# Patient Record
Sex: FEMALE | Race: WHITE | Employment: FULL TIME | URBAN - METROPOLITAN AREA
[De-identification: names, ages, dates, MRNs, and addresses within clinical notes are randomized per-mention and may not be internally consistent; named-entity substitution may affect disease eponyms.]

---

## 2018-01-18 ENCOUNTER — HOSPITAL ENCOUNTER (OUTPATIENT)
Age: 43
Discharge: HOME | End: 2018-01-18
Attending: FAMILY MEDICINE
Payer: COMMERCIAL

## 2018-01-18 VITALS
SYSTOLIC BLOOD PRESSURE: 133 MMHG | WEIGHT: 190 LBS | DIASTOLIC BLOOD PRESSURE: 79 MMHG | HEIGHT: 68 IN | HEART RATE: 92 BPM | OXYGEN SATURATION: 99 % | BODY MASS INDEX: 28.79 KG/M2 | TEMPERATURE: 101 F | RESPIRATION RATE: 20 BRPM

## 2018-01-18 DIAGNOSIS — J11.1 INFLUENZA: ICD-10-CM

## 2018-01-18 DIAGNOSIS — R50.9 FEBRILE ILLNESS, ACUTE: Primary | ICD-10-CM

## 2018-01-18 LAB
FLUAV + FLUBV RNA SPEC NAA+PROBE: NEGATIVE
FLUAV + FLUBV RNA SPEC NAA+PROBE: NEGATIVE
FLUAV + FLUBV RNA SPEC NAA+PROBE: POSITIVE

## 2018-01-18 PROCEDURE — 99204 OFFICE O/P NEW MOD 45 MIN: CPT

## 2018-01-18 PROCEDURE — 87631 RESP VIRUS 3-5 TARGETS: CPT | Performed by: FAMILY MEDICINE

## 2018-01-18 PROCEDURE — 99203 OFFICE O/P NEW LOW 30 MIN: CPT

## 2018-01-18 RX ORDER — OSELTAMIVIR PHOSPHATE 75 MG/1
75 CAPSULE ORAL 2 TIMES DAILY
Qty: 10 CAPSULE | Refills: 0 | Status: SHIPPED | OUTPATIENT
Start: 2018-01-18 | End: 2020-10-28

## 2018-01-18 NOTE — ED NOTES
Pt reports having a temp of 100.3 a hour ago and took tylenol. Also notes taking advil approx 4 hours ago.

## 2018-01-18 NOTE — ED PROVIDER NOTES
Patient Seen in: [facility]    History   CC:  Patient presents with:  Fever (infectious)    Stated Complaint: Flu Symptoms    ------------------------------  Per Rn:     Pt--reports having a temp of 100.3 a hour ago and canals, both ears   Nose:   Nares normal, septum midline, mucosa normal, min drainage, clear, no sinus tenderness   Throat:   mild hyperemia, no exudate;    Neck:   Supple, symmetrical, trachea midline, no adenopathy;     thyroid:  no enlargement/tendernes

## 2018-01-18 NOTE — ED INITIAL ASSESSMENT (HPI)
Pt presents to the IC with c/o aches, chills, and low grade fever. +nasal congestion with sinus pressure. +cough, non-productive. +ear pain to the right. No sore throat.

## 2018-06-09 ENCOUNTER — HOSPITAL ENCOUNTER (OUTPATIENT)
Age: 43
Discharge: HOME | End: 2018-06-09
Attending: EMERGENCY MEDICINE
Payer: COMMERCIAL

## 2018-06-09 VITALS
DIASTOLIC BLOOD PRESSURE: 88 MMHG | OXYGEN SATURATION: 99 % | WEIGHT: 180 LBS | HEIGHT: 69 IN | RESPIRATION RATE: 16 BRPM | HEART RATE: 89 BPM | SYSTOLIC BLOOD PRESSURE: 133 MMHG | BODY MASS INDEX: 26.66 KG/M2 | TEMPERATURE: 99 F

## 2018-06-09 DIAGNOSIS — L01.00 IMPETIGO: Primary | ICD-10-CM

## 2018-06-09 PROCEDURE — 99214 OFFICE O/P EST MOD 30 MIN: CPT

## 2018-06-09 PROCEDURE — 99213 OFFICE O/P EST LOW 20 MIN: CPT

## 2018-06-09 RX ORDER — DESVENLAFAXINE 100 MG/1
100 TABLET, EXTENDED RELEASE ORAL DAILY
COMMUNITY
End: 2020-10-28

## 2018-06-09 RX ORDER — AMOXICILLIN 875 MG/1
875 TABLET, COATED ORAL 2 TIMES DAILY
Qty: 20 TABLET | Refills: 0 | Status: SHIPPED | OUTPATIENT
Start: 2018-06-09 | End: 2018-06-19

## 2018-06-09 NOTE — ED PROVIDER NOTES
Patient Seen in: [address]    History   Patient presents with: Impetigo    Stated Complaint:     HPI    Patient is a 56-year-old female who in the last 24 hours developed a sore above her upper lip.   She also has one i Abdominal: Soft. Bowel sounds are normal. Exhibits no distension and no mass. There is no tenderness. There is no rebound and no guarding. Musculoskeletal: Normal range of motion. Exhibits no edema or tenderness.    Lymphadenopathy: No cervical adenopat

## 2018-06-09 NOTE — ED INITIAL ASSESSMENT (HPI)
Patietn complains of Impetigo on center of upper lip and right nostril. Patient has had impetigo in past. Patient states it is itchy. Patient states that the one on upper lip is painful.

## 2018-06-18 ENCOUNTER — HOSPITAL ENCOUNTER (OUTPATIENT)
Age: 43
Discharge: HOME | End: 2018-06-18
Attending: FAMILY MEDICINE
Payer: COMMERCIAL

## 2018-06-18 VITALS
HEART RATE: 85 BPM | SYSTOLIC BLOOD PRESSURE: 127 MMHG | DIASTOLIC BLOOD PRESSURE: 82 MMHG | TEMPERATURE: 99 F | RESPIRATION RATE: 20 BRPM | OXYGEN SATURATION: 100 %

## 2018-06-18 DIAGNOSIS — M75.82 TENDINITIS OF LEFT ROTATOR CUFF: Primary | ICD-10-CM

## 2018-06-18 PROCEDURE — 99214 OFFICE O/P EST MOD 30 MIN: CPT

## 2018-06-18 PROCEDURE — 99213 OFFICE O/P EST LOW 20 MIN: CPT

## 2018-06-18 RX ORDER — TRAMADOL HYDROCHLORIDE 50 MG/1
50 TABLET ORAL EVERY 6 HOURS PRN
Qty: 8 TABLET | Refills: 0 | Status: SHIPPED | OUTPATIENT
Start: 2018-06-18 | End: 2018-06-20

## 2018-06-18 RX ORDER — NAPROXEN 500 MG/1
500 TABLET ORAL 2 TIMES DAILY WITH MEALS
Qty: 14 TABLET | Refills: 0 | Status: SHIPPED | OUTPATIENT
Start: 2018-06-18 | End: 2018-06-25

## 2018-06-18 NOTE — ED PROVIDER NOTES
Patient Seen in: [address]    History   Patient presents with:  Upper Extremity Injury (musculoskeletal)    Stated Complaint: left shoulder pain    HPI    Patient is here with the left shoulder pain.   Patient sprained h deformity. Range of movement is limited partially to full abduction and external rotation. No difficulty with adduction and internal rotation. Left elbow, wrist range of movement is normal.  Normal distal motor function and sensory. Normal DTR.     L

## 2018-06-18 NOTE — ED INITIAL ASSESSMENT (HPI)
Patient is here with left shoulder pain which started while doing burpees  She states her left shoulder is painful to move.

## 2019-07-05 ENCOUNTER — HOSPITAL ENCOUNTER (OUTPATIENT)
Age: 44
Discharge: HOME | End: 2019-07-05
Attending: EMERGENCY MEDICINE
Payer: COMMERCIAL

## 2019-07-05 VITALS
HEIGHT: 68 IN | HEART RATE: 75 BPM | OXYGEN SATURATION: 100 % | RESPIRATION RATE: 16 BRPM | TEMPERATURE: 98 F | BODY MASS INDEX: 27.28 KG/M2 | WEIGHT: 180 LBS | SYSTOLIC BLOOD PRESSURE: 131 MMHG | DIASTOLIC BLOOD PRESSURE: 75 MMHG

## 2019-07-05 DIAGNOSIS — B36.0 PITYRIASIS VERSICOLOR: Primary | ICD-10-CM

## 2019-07-05 DIAGNOSIS — K64.9 HEMORRHOIDS, UNSPECIFIED HEMORRHOID TYPE: ICD-10-CM

## 2019-07-05 PROCEDURE — 99213 OFFICE O/P EST LOW 20 MIN: CPT

## 2019-07-05 RX ORDER — KETOCONAZOLE 20 MG/G
1 CREAM TOPICAL DAILY
Qty: 15 G | Refills: 1 | Status: SHIPPED | OUTPATIENT
Start: 2019-07-05 | End: 2020-10-28

## 2019-07-05 NOTE — ED INITIAL ASSESSMENT (HPI)
Patient states having rash to upper abdominal area x several days. 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 Patient denies fever, denies itchiness, denies blistering/drainage, denies pain to affected area.

## 2019-07-05 NOTE — ED PROVIDER NOTES
Patient Seen in: [address]    History   Patient presents with:  Rash Skin Problem (integumentary)    Stated Complaint: rash    HPI    The patient is a 40-year-old female with past history of recently diagnosed herpes si conjunctival injection  ENT: TMs are clear and flat bilaterally.   There is no posterior pharyngeal erythema  Chest: Clear to auscultation, no tenderness  Cardiovascular: Regular rate and rhythm without murmur  Abdomen: Soft, nontender and nondistended  Rec

## 2020-01-01 ENCOUNTER — HOSPITAL ENCOUNTER (OUTPATIENT)
Age: 45
Discharge: HOME | End: 2020-01-01
Attending: EMERGENCY MEDICINE
Payer: COMMERCIAL

## 2020-01-01 VITALS
HEART RATE: 77 BPM | SYSTOLIC BLOOD PRESSURE: 153 MMHG | OXYGEN SATURATION: 98 % | RESPIRATION RATE: 18 BRPM | DIASTOLIC BLOOD PRESSURE: 65 MMHG | TEMPERATURE: 98 F

## 2020-01-01 DIAGNOSIS — S29.012A STRAIN OF THORACIC SPINE: Primary | ICD-10-CM

## 2020-01-01 PROCEDURE — 99213 OFFICE O/P EST LOW 20 MIN: CPT

## 2020-01-01 PROCEDURE — 99214 OFFICE O/P EST MOD 30 MIN: CPT

## 2020-01-01 RX ORDER — IBUPROFEN 400 MG/1
400 TABLET ORAL EVERY 8 HOURS PRN
Qty: 30 TABLET | Refills: 0 | Status: SHIPPED | OUTPATIENT
Start: 2020-01-01 | End: 2020-01-08

## 2020-01-01 RX ORDER — CYCLOBENZAPRINE HCL 10 MG
5 TABLET ORAL 3 TIMES DAILY PRN
Qty: 15 TABLET | Refills: 0 | Status: SHIPPED | OUTPATIENT
Start: 2020-01-01 | End: 2020-01-06

## 2020-01-01 NOTE — ED INITIAL ASSESSMENT (HPI)
Patient is here after pulling on a thing at work hurting her back. She is having left mid back pain.

## 2020-01-01 NOTE — ED PROVIDER NOTES
Patient Seen in: [address]      History   Patient presents with:  Back Pain    Stated Complaint: back pain    HPI    The patient is a 57-year-old female with no significant past medical history presents now with left th no tenderness  Cardiovascular: Regular rate and rhythm without murmur  Abdomen: Soft, nontender and nondistended  Back: There is mild focal left thoracic muscular tenderness to palpation, just medial to the left scapula.   This tenderness worsens with palpa

## 2020-10-28 ENCOUNTER — HOSPITAL ENCOUNTER (OUTPATIENT)
Age: 45
Discharge: HOME | End: 2020-10-28
Payer: COMMERCIAL

## 2020-10-28 VITALS
RESPIRATION RATE: 17 BRPM | TEMPERATURE: 98 F | SYSTOLIC BLOOD PRESSURE: 137 MMHG | WEIGHT: 190 LBS | BODY MASS INDEX: 29 KG/M2 | OXYGEN SATURATION: 100 % | HEART RATE: 81 BPM | DIASTOLIC BLOOD PRESSURE: 90 MMHG

## 2020-10-28 DIAGNOSIS — B34.9 VIRAL ILLNESS: Primary | ICD-10-CM

## 2020-10-28 DIAGNOSIS — Z20.822 EXPOSURE TO COVID-19 VIRUS: ICD-10-CM

## 2020-10-28 DIAGNOSIS — R51.9 ACUTE NONINTRACTABLE HEADACHE, UNSPECIFIED HEADACHE TYPE: ICD-10-CM

## 2020-10-28 DIAGNOSIS — Z20.822 ENCOUNTER FOR SCREENING LABORATORY TESTING FOR COVID-19 VIRUS: ICD-10-CM

## 2020-10-28 PROCEDURE — 99202 OFFICE O/P NEW SF 15 MIN: CPT | Performed by: NURSE PRACTITIONER

## 2020-10-28 PROCEDURE — U0003 INFECTIOUS AGENT DETECTION BY NUCLEIC ACID (DNA OR RNA); SEVERE ACUTE RESPIRATORY SYNDROME CORONAVIRUS 2 (SARS-COV-2) (CORONAVIRUS DISEASE [COVID-19]), AMPLIFIED PROBE TECHNIQUE, MAKING USE OF HIGH THROUGHPUT TECHNOLOGIES AS DESCRIBED BY CMS-2020-01-R: HCPCS | Performed by: NURSE PRACTITIONER

## 2020-10-28 RX ORDER — DESVENLAFAXINE 100 MG/1
100 TABLET, EXTENDED RELEASE ORAL DAILY
COMMUNITY

## 2020-10-28 NOTE — ED PROVIDER NOTES
Patient Seen in: [facility]      History   Patient presents with:  Headache  Body ache and/or chills  Fatigue    Stated Complaint: testing    HPI    This is a 40-year-old female is presenting with headache body aches for 2 days.   Patient sta Cardiovascular:      Rate and Rhythm: Normal rate. Pulmonary:      Effort: Pulmonary effort is normal.      Breath sounds: Normal breath sounds.       Comments: Lung sounds clear to auscultation no wheezing no hypoxia no respiratory distress  Musculoske

## 2020-11-30 ENCOUNTER — HOSPITAL ENCOUNTER (OUTPATIENT)
Age: 45
Discharge: HOME | End: 2020-11-30
Payer: COMMERCIAL

## 2020-11-30 ENCOUNTER — APPOINTMENT (OUTPATIENT)
Dept: GENERAL RADIOLOGY | Age: 45
End: 2020-11-30
Attending: NURSE PRACTITIONER
Payer: COMMERCIAL

## 2020-11-30 VITALS
RESPIRATION RATE: 19 BRPM | OXYGEN SATURATION: 100 % | TEMPERATURE: 98 F | WEIGHT: 190 LBS | HEART RATE: 98 BPM | SYSTOLIC BLOOD PRESSURE: 122 MMHG | DIASTOLIC BLOOD PRESSURE: 90 MMHG | BODY MASS INDEX: 29 KG/M2

## 2020-11-30 DIAGNOSIS — J18.9 PNEUMONIA DUE TO INFECTIOUS ORGANISM, UNSPECIFIED LATERALITY, UNSPECIFIED PART OF LUNG: Primary | ICD-10-CM

## 2020-11-30 PROCEDURE — 87880 STREP A ASSAY W/OPTIC: CPT | Performed by: NURSE PRACTITIONER

## 2020-11-30 PROCEDURE — 99214 OFFICE O/P EST MOD 30 MIN: CPT | Performed by: NURSE PRACTITIONER

## 2020-11-30 PROCEDURE — 71046 X-RAY EXAM CHEST 2 VIEWS: CPT | Performed by: NURSE PRACTITIONER

## 2020-11-30 RX ORDER — ALBUTEROL SULFATE 90 UG/1
2 AEROSOL, METERED RESPIRATORY (INHALATION) EVERY 4 HOURS PRN
Qty: 1 INHALER | Refills: 0 | Status: SHIPPED | OUTPATIENT
Start: 2020-11-30 | End: 2020-12-30

## 2020-11-30 RX ORDER — DOXYCYCLINE HYCLATE 100 MG/1
100 CAPSULE ORAL 2 TIMES DAILY
Qty: 20 CAPSULE | Refills: 0 | Status: SHIPPED | OUTPATIENT
Start: 2020-11-30 | End: 2020-12-10

## 2020-11-30 NOTE — ED INITIAL ASSESSMENT (HPI)
Pt states she has had a cough, headache, chest pain when she takes a deep breath, runny nose and wheezing for 3 weeks. Pt was tested for x2 for Covid previously which were negative. Symptoms are worse at night.

## 2020-11-30 NOTE — ED PROVIDER NOTES
Patient presents with:  Cough/URI      HPI:     Camilla Ch is a 50year old female who presents for evaluation of a cough and congestion for the past 3 weeks.   She states she has had a productive cough with green and yellow sputum and thick pur file        Gets together: Not on file        Attends Muslim service: Not on file        Active member of club or organization: Not on file        Attends meetings of clubs or organizations: Not on file        Relationship status: Not on file      Intim Reason for Exam?          Answer: cold      POCT Rapid Strep Once      2019 Novel Coronavirus SARS-CoV-2 by PCR ([facility]) Once      POCT Rapid Strep      Doxycycline Hyclate 100 MG Oral Cap          Sig: Take 1 capsule (100 mg total) by mouth 2 (two) times d with:  [facility] [employer]  [address]    Schedule an appointment as soon as possible for a visit in 3 days

## 2022-01-13 ENCOUNTER — APPOINTMENT (OUTPATIENT)
Dept: GENERAL RADIOLOGY | Age: 47
End: 2022-01-13
Attending: NURSE PRACTITIONER
Payer: COMMERCIAL

## 2022-01-13 ENCOUNTER — HOSPITAL ENCOUNTER (OUTPATIENT)
Age: 47
Discharge: HOME | End: 2022-01-13
Payer: COMMERCIAL

## 2022-01-13 VITALS
RESPIRATION RATE: 18 BRPM | OXYGEN SATURATION: 100 % | HEIGHT: 69 IN | BODY MASS INDEX: 26.66 KG/M2 | WEIGHT: 180 LBS | DIASTOLIC BLOOD PRESSURE: 96 MMHG | SYSTOLIC BLOOD PRESSURE: 135 MMHG | HEART RATE: 80 BPM | TEMPERATURE: 99 F

## 2022-01-13 DIAGNOSIS — J06.9 UPPER RESPIRATORY VIRUS: Primary | ICD-10-CM

## 2022-01-13 LAB
S PYO AG THROAT QL: NEGATIVE
SARS-COV-2 RNA RESP QL NAA+PROBE: NOT DETECTED

## 2022-01-13 PROCEDURE — U0002 COVID-19 LAB TEST NON-CDC: HCPCS | Performed by: NURSE PRACTITIONER

## 2022-01-13 PROCEDURE — 71046 X-RAY EXAM CHEST 2 VIEWS: CPT | Performed by: NURSE PRACTITIONER

## 2022-01-13 PROCEDURE — 87880 STREP A ASSAY W/OPTIC: CPT | Performed by: NURSE PRACTITIONER

## 2022-01-13 PROCEDURE — 99213 OFFICE O/P EST LOW 20 MIN: CPT | Performed by: NURSE PRACTITIONER

## 2022-01-13 RX ORDER — BENZONATATE 100 MG/1
200 CAPSULE ORAL 3 TIMES DAILY PRN
Qty: 30 CAPSULE | Refills: 0 | Status: SHIPPED | OUTPATIENT
Start: 2022-01-13 | End: 2022-02-12

## 2022-01-13 RX ORDER — ALBUTEROL SULFATE 90 UG/1
2 AEROSOL, METERED RESPIRATORY (INHALATION) EVERY 4 HOURS PRN
Qty: 1 EACH | Refills: 0 | Status: SHIPPED | OUTPATIENT
Start: 2022-01-13 | End: 2022-02-12

## 2022-01-13 NOTE — ED INITIAL ASSESSMENT (HPI)
Pt presents with sore throat and chest congestion today in am. Pt has hx of pneumonia - Pt stated, \"worried about pneumonia, I'd like a chest Xray\".

## 2022-01-13 NOTE — ED PROVIDER NOTES
Patient presents with:  Sore Throat      HPI:     Tanisha Ramirez is a 48year old female who presents for productive cough with green sputum, sore throat, nasal congestion that started today. No chest pain or difficulty breathing. No fevers.   She Wt 81.6 kg   SpO2 100%   BMI 26.58 kg/m²   GENERAL: well developed, well nourished, well hydrated, no distress  SKIN: good skin turgor, no obvious rashes  NECK: supple, no adenopathy. No neck stiffness.   CARDIO: RRR without murmur  EXTREMITIES: no cyanosis 01/13/22 10:21 AM   Result Value Ref Range    POCT Rapid Strep Negative Negative       MDM:   XR CHEST PA + LAT CHEST (CPT=71046)    Result Date: 1/13/2022  CONCLUSION:  1. Normal heart and lungs. 2. Osteoarthritis. 3. Little change from November 30, 2020.

## 2022-05-26 ENCOUNTER — HOSPITAL ENCOUNTER (OUTPATIENT)
Age: 47
Discharge: HOME | End: 2022-05-26
Payer: COMMERCIAL

## 2022-05-26 VITALS
TEMPERATURE: 101 F | HEART RATE: 93 BPM | RESPIRATION RATE: 18 BRPM | DIASTOLIC BLOOD PRESSURE: 69 MMHG | WEIGHT: 190 LBS | BODY MASS INDEX: 28.79 KG/M2 | HEIGHT: 68 IN | OXYGEN SATURATION: 98 % | SYSTOLIC BLOOD PRESSURE: 121 MMHG

## 2022-05-26 DIAGNOSIS — R50.9 FEVER: ICD-10-CM

## 2022-05-26 DIAGNOSIS — Z20.822 ENCOUNTER FOR SCREENING LABORATORY TESTING FOR COVID-19 VIRUS: ICD-10-CM

## 2022-05-26 DIAGNOSIS — U07.1 COVID-19 VIRUS INFECTION: Primary | ICD-10-CM

## 2022-05-26 LAB
POCT INFLUENZA A: NEGATIVE
POCT INFLUENZA B: NEGATIVE
SARS-COV-2 RNA RESP QL NAA+PROBE: DETECTED

## 2022-05-26 RX ORDER — BEBTELOVIMAB 87.5 MG/ML
175 INJECTION, SOLUTION INTRAVENOUS ONCE
Status: COMPLETED | OUTPATIENT
Start: 2022-05-26 | End: 2022-05-26

## 2022-05-26 RX ORDER — ACETAMINOPHEN 500 MG
1000 TABLET ORAL ONCE
Status: COMPLETED | OUTPATIENT
Start: 2022-05-26 | End: 2022-05-26

## 2022-05-26 RX ORDER — ACETAMINOPHEN 325 MG/1
650 TABLET ORAL
Qty: 40 TABLET | Refills: 0 | Status: SHIPPED | OUTPATIENT
Start: 2022-05-26

## 2022-05-26 NOTE — ED QUICK NOTES
1130- pt medicated per order, aware of plan of care. 1150- pt stable , denies any new complaints . 1255-pt discharged stable , denies any new complaints, iv d/c'd , pt breathing easy , no distress noted.

## 2022-06-04 ENCOUNTER — HOSPITAL ENCOUNTER (OUTPATIENT)
Age: 47
Discharge: HOME | End: 2022-06-04
Payer: COMMERCIAL

## 2022-06-04 VITALS
TEMPERATURE: 98 F | HEART RATE: 88 BPM | DIASTOLIC BLOOD PRESSURE: 85 MMHG | RESPIRATION RATE: 18 BRPM | OXYGEN SATURATION: 100 % | SYSTOLIC BLOOD PRESSURE: 129 MMHG

## 2022-06-04 DIAGNOSIS — H66.91 RIGHT OTITIS MEDIA, UNSPECIFIED OTITIS MEDIA TYPE: Primary | ICD-10-CM

## 2022-06-04 LAB — S PYO AG THROAT QL: NEGATIVE

## 2022-06-04 RX ORDER — AMOXICILLIN 875 MG/1
875 TABLET, COATED ORAL 2 TIMES DAILY
Qty: 20 TABLET | Refills: 0 | Status: SHIPPED | OUTPATIENT
Start: 2022-06-04 | End: 2022-06-14

## 2022-10-08 NOTE — ED INITIAL ASSESSMENT (HPI)
Pt presents with headache, body aches and fatigue for 2 days. Had an exposure to a co-worker 4 days ago who tested positive to covid. 08-Oct-2022 01:28

## 2022-10-26 ENCOUNTER — HOSPITAL ENCOUNTER (OUTPATIENT)
Age: 47
Discharge: HOME | End: 2022-10-26
Payer: COMMERCIAL

## 2022-10-26 VITALS
SYSTOLIC BLOOD PRESSURE: 140 MMHG | OXYGEN SATURATION: 100 % | TEMPERATURE: 99 F | RESPIRATION RATE: 18 BRPM | HEART RATE: 89 BPM | DIASTOLIC BLOOD PRESSURE: 95 MMHG

## 2022-10-26 DIAGNOSIS — L01.00 IMPETIGO: Primary | ICD-10-CM

## 2022-10-26 PROCEDURE — 99213 OFFICE O/P EST LOW 20 MIN: CPT | Performed by: PHYSICIAN ASSISTANT

## 2022-10-26 RX ORDER — CEPHALEXIN 500 MG/1
500 CAPSULE ORAL 4 TIMES DAILY
Qty: 28 CAPSULE | Refills: 0 | Status: SHIPPED | OUTPATIENT
Start: 2022-10-26 | End: 2022-11-02

## 2022-10-26 NOTE — ED INITIAL ASSESSMENT (HPI)
Patient comes in with complaints of what she has had before and referred to it as impetigo, itchy, crusty, only on her face x 2days

## 2023-02-03 ENCOUNTER — APPOINTMENT (OUTPATIENT)
Dept: GENERAL RADIOLOGY | Age: 48
End: 2023-02-03
Attending: NURSE PRACTITIONER
Payer: COMMERCIAL

## 2023-02-03 ENCOUNTER — HOSPITAL ENCOUNTER (OUTPATIENT)
Age: 48
Discharge: HOME | End: 2023-02-03
Payer: COMMERCIAL

## 2023-02-03 VITALS
OXYGEN SATURATION: 100 % | RESPIRATION RATE: 20 BRPM | DIASTOLIC BLOOD PRESSURE: 71 MMHG | SYSTOLIC BLOOD PRESSURE: 120 MMHG | WEIGHT: 190 LBS | BODY MASS INDEX: 28.79 KG/M2 | TEMPERATURE: 99 F | HEART RATE: 96 BPM | HEIGHT: 68 IN

## 2023-02-03 DIAGNOSIS — Z77.098 CHEMICAL EXPOSURE: Primary | ICD-10-CM

## 2023-02-03 PROCEDURE — 99213 OFFICE O/P EST LOW 20 MIN: CPT | Performed by: NURSE PRACTITIONER

## 2023-02-03 PROCEDURE — 71046 X-RAY EXAM CHEST 2 VIEWS: CPT | Performed by: NURSE PRACTITIONER

## 2023-02-03 PROCEDURE — 94640 AIRWAY INHALATION TREATMENT: CPT | Performed by: NURSE PRACTITIONER

## 2023-02-03 RX ORDER — ONDANSETRON 4 MG/1
4 TABLET, ORALLY DISINTEGRATING ORAL ONCE
Status: COMPLETED | OUTPATIENT
Start: 2023-02-03 | End: 2023-02-03

## 2023-02-03 RX ORDER — ALBUTEROL SULFATE 2.5 MG/3ML
2.5 SOLUTION RESPIRATORY (INHALATION) ONCE
Status: COMPLETED | OUTPATIENT
Start: 2023-02-03 | End: 2023-02-03

## 2023-02-03 RX ORDER — ALBUTEROL SULFATE 90 UG/1
2 AEROSOL, METERED RESPIRATORY (INHALATION) EVERY 4 HOURS PRN
Qty: 1 EACH | Refills: 0 | Status: SHIPPED | OUTPATIENT
Start: 2023-02-03 | End: 2023-03-05

## 2023-02-03 NOTE — DISCHARGE INSTRUCTIONS
Do not mix or use those chemicals again in a closed area. Use the albuterol inhaler every 4 hours as needed. Follow-up with your doctor. If you develop any worsening or concerning symptoms, go to the nearest emergency department for further evaluation.

## 2023-02-03 NOTE — ED INITIAL ASSESSMENT (HPI)
"Physical Therapy Evaluation completed.   Bed Mobility:  Supine to Sit: Supervised  Transfers: Sit to Stand: Supervised  Gait: Level Of Assist: Contact Guard Assist with No Equipment Needed       Plan of Care: Will benefit from Physical Therapy 3 times per week  Discharge Recommendations: Equipment: Front-Wheel Walker. Post-acute therapy Discharge to a transitional care facility for continued skilled therapy services.    See \"Rehab Therapy-Acute\" Patient Summary Report for complete documentation.     " Patient was cleaning with limeaway and scrubbing bubbles for 5min straight and now feels very nauseous, a burning within her airway, a cough that burns.  She feels that she may have had a toxic poising with all th chemicals happened today

## 2023-11-03 ENCOUNTER — HOSPITAL ENCOUNTER (OUTPATIENT)
Age: 48
Discharge: HOME | End: 2023-11-03
Payer: COMMERCIAL

## 2023-11-03 VITALS
DIASTOLIC BLOOD PRESSURE: 76 MMHG | OXYGEN SATURATION: 97 % | TEMPERATURE: 98 F | SYSTOLIC BLOOD PRESSURE: 113 MMHG | RESPIRATION RATE: 18 BRPM | HEART RATE: 88 BPM

## 2023-11-03 DIAGNOSIS — J10.1 INFLUENZA A: Primary | ICD-10-CM

## 2023-11-03 DIAGNOSIS — R50.9 FEVER: ICD-10-CM

## 2023-11-03 LAB
POCT INFLUENZA A: POSITIVE
POCT INFLUENZA B: NEGATIVE
SARS-COV-2 RNA RESP QL NAA+PROBE: NOT DETECTED

## 2023-11-03 RX ORDER — OSELTAMIVIR PHOSPHATE 75 MG/1
75 CAPSULE ORAL 2 TIMES DAILY
Qty: 10 CAPSULE | Refills: 0 | Status: SHIPPED | OUTPATIENT
Start: 2023-11-03 | End: 2023-11-08

## 2023-11-03 NOTE — DISCHARGE INSTRUCTIONS
As discussed, you are negative for COVID-19. However, you have influenza. Tamiflu prescribed, you may take this medication twice a day for 5 days. Please drink plenty of water and electrolytes and stay well-hydrated. Continue to take Tylenol every 4 hours and Motrin every 6 hours. Get plenty of rest/sleep. Sleep somewhat elevated upright. Sleep with humidifier. Steam showers for cough and congestion. You may take over-the-counter cold medication for cough, congestion, runny nose. Please stay at home for the next 5 days while you are recovering from the flu. Avoid contact with any elderly, infant/children, immunocompromise people. If you have any worsening symptoms of chest pain, dizziness, lightheadedness, palpitations, shortness of breath, please go to emergency room.

## 2023-11-07 ENCOUNTER — APPOINTMENT (OUTPATIENT)
Dept: GENERAL RADIOLOGY | Age: 48
End: 2023-11-07
Attending: NURSE PRACTITIONER
Payer: COMMERCIAL

## 2023-11-07 ENCOUNTER — HOSPITAL ENCOUNTER (OUTPATIENT)
Age: 48
Discharge: HOME | End: 2023-11-07
Payer: COMMERCIAL

## 2023-11-07 VITALS
HEART RATE: 81 BPM | DIASTOLIC BLOOD PRESSURE: 66 MMHG | TEMPERATURE: 99 F | SYSTOLIC BLOOD PRESSURE: 108 MMHG | OXYGEN SATURATION: 97 % | RESPIRATION RATE: 16 BRPM

## 2023-11-07 DIAGNOSIS — J10.1 INFLUENZA A: Primary | ICD-10-CM

## 2023-11-07 DIAGNOSIS — J18.9 COMMUNITY ACQUIRED PNEUMONIA OF RIGHT LOWER LOBE OF LUNG: ICD-10-CM

## 2023-11-07 PROCEDURE — 71046 X-RAY EXAM CHEST 2 VIEWS: CPT | Performed by: NURSE PRACTITIONER

## 2023-11-07 PROCEDURE — 94640 AIRWAY INHALATION TREATMENT: CPT | Performed by: NURSE PRACTITIONER

## 2023-11-07 PROCEDURE — 99213 OFFICE O/P EST LOW 20 MIN: CPT | Performed by: NURSE PRACTITIONER

## 2023-11-07 RX ORDER — ALBUTEROL SULFATE 90 UG/1
2 AEROSOL, METERED RESPIRATORY (INHALATION) EVERY 4 HOURS PRN
Qty: 1 EACH | Refills: 0 | Status: SHIPPED | OUTPATIENT
Start: 2023-11-07 | End: 2023-12-07

## 2023-11-07 RX ORDER — INHALER, ASSIST DEVICES
SPACER (EA) MISCELLANEOUS
Qty: 1 EACH | Refills: 0 | Status: SHIPPED | OUTPATIENT
Start: 2023-11-07

## 2023-11-07 RX ORDER — AMOXICILLIN AND CLAVULANATE POTASSIUM 875; 125 MG/1; MG/1
1 TABLET, FILM COATED ORAL 2 TIMES DAILY
Qty: 14 TABLET | Refills: 0 | Status: SHIPPED | OUTPATIENT
Start: 2023-11-07 | End: 2023-11-14

## 2023-11-07 RX ORDER — IPRATROPIUM BROMIDE AND ALBUTEROL SULFATE 2.5; .5 MG/3ML; MG/3ML
3 SOLUTION RESPIRATORY (INHALATION) ONCE
Status: COMPLETED | OUTPATIENT
Start: 2023-11-07 | End: 2023-11-07

## 2023-11-07 RX ORDER — BENZONATATE 100 MG/1
100 CAPSULE ORAL 3 TIMES DAILY PRN
Qty: 10 CAPSULE | Refills: 0 | Status: SHIPPED | OUTPATIENT
Start: 2023-11-07

## 2023-11-07 RX ORDER — CODEINE PHOSPHATE AND GUAIFENESIN 10; 100 MG/5ML; MG/5ML
10 SOLUTION ORAL EVERY 6 HOURS PRN
Qty: 120 ML | Refills: 0 | Status: SHIPPED | OUTPATIENT
Start: 2023-11-07

## 2023-11-07 RX ORDER — PREDNISONE 20 MG/1
40 TABLET ORAL DAILY
Qty: 10 TABLET | Refills: 0 | Status: SHIPPED | OUTPATIENT
Start: 2023-11-07 | End: 2023-11-12

## 2023-11-07 RX ORDER — DOXYCYCLINE HYCLATE 100 MG/1
100 CAPSULE ORAL 2 TIMES DAILY
Qty: 14 CAPSULE | Refills: 0 | Status: SHIPPED | OUTPATIENT
Start: 2023-11-07 | End: 2023-11-14

## 2023-11-07 NOTE — ED INITIAL ASSESSMENT (HPI)
Patient stating 2 weeks ago, pt was dx with flu. Patient reports she now has cold sores and a rash in nose.

## 2023-11-07 NOTE — DISCHARGE INSTRUCTIONS
Stop the Tamiflu. Take both antibiotics twice a day until gone. Take the steroid daily. Take Tessalon as needed for cough and use the codeine cough syrup for nighttime. Increase oral fluids. Rest.  Apply the cream to the outside of your nose. Recheck with your primary doctor later this week or early next week.

## 2024-09-29 ENCOUNTER — APPOINTMENT (OUTPATIENT)
Dept: GENERAL RADIOLOGY | Age: 49
End: 2024-09-29
Attending: PHYSICIAN ASSISTANT
Payer: COMMERCIAL

## 2024-09-29 ENCOUNTER — HOSPITAL ENCOUNTER (OUTPATIENT)
Age: 49
Discharge: HOME | End: 2024-09-29
Payer: COMMERCIAL

## 2024-09-29 VITALS
OXYGEN SATURATION: 97 % | DIASTOLIC BLOOD PRESSURE: 79 MMHG | HEART RATE: 83 BPM | RESPIRATION RATE: 16 BRPM | SYSTOLIC BLOOD PRESSURE: 126 MMHG | TEMPERATURE: 97 F

## 2024-09-29 DIAGNOSIS — R05.9 COUGH: ICD-10-CM

## 2024-09-29 DIAGNOSIS — J06.9 URI WITH COUGH AND CONGESTION: Primary | ICD-10-CM

## 2024-09-29 PROCEDURE — 99213 OFFICE O/P EST LOW 20 MIN: CPT | Performed by: PHYSICIAN ASSISTANT

## 2024-09-29 PROCEDURE — 71046 X-RAY EXAM CHEST 2 VIEWS: CPT | Performed by: PHYSICIAN ASSISTANT

## 2024-09-29 RX ORDER — ALBUTEROL SULFATE 90 UG/1
2 INHALANT RESPIRATORY (INHALATION) EVERY 4 HOURS PRN
Qty: 1 EACH | Refills: 0 | Status: SHIPPED | OUTPATIENT
Start: 2024-09-29 | End: 2024-10-29

## 2024-09-29 NOTE — ED INITIAL ASSESSMENT (HPI)
Patient report productive cough x4 days ago. Attempted saline drops with little relief. +fatigue. denies fevers or chills.  Patient states she has had pna in the past and feels as if this is pna as well. Patient states she did an at home covid test a day ago which was negative.

## 2024-09-29 NOTE — ED PROVIDER NOTES
Patient Seen in: [facility]      History   No chief complaint on file.    Stated Complaint: Chest Congestion    Subjective:   HPI    Patient is a 49-year-old female that presents to immediate care due to cough x 4 days.  Associate symptoms include sinus congestion.  Has been taking over-the-counter Mucinex with mild relief.  Patient states cough became more productive last night.  States that she has had symptoms similar in the past diagnosed with pneumonia.  Denying chest pain shortness of breath fever.    Objective:   Past Medical History:    Anxiety    Depression    HSV (herpes simplex virus) infection              Past Surgical History:   Procedure Laterality Date    Ablation       delivery only      Repair ing hernia,3+y/o,reducibl                  Social History     Socioeconomic History    Marital status:    Tobacco Use    Smoking status: Never     Passive exposure: Never    Smokeless tobacco: Never   Vaping Use    Vaping status: Never Used   Substance and Sexual Activity    Alcohol use: Yes     Comment: occ    Drug use: Never     Social Determinants of Health      Received from [facility]              Review of Systems    Positive for stated Chief Complaint: No chief complaint on file.    Other systems are as noted in HPI.  Constitutional and vital signs reviewed.      All other systems reviewed and negative except as noted above.    Physical Exam     ED Triage Vitals [24 1223]   /79   Pulse 83   Resp 16   Temp 97.3 °F (36.3 °C)   Temp src Temporal   SpO2 97 %   O2 Device None (Room air)       Current Vitals:   Vital Signs  BP: 126/79  Pulse: 83  Resp: 16  Temp: 97.3 °F (36.3 °C)  Temp src: Temporal    Oxygen Therapy  SpO2: 97 %  O2 Device: None (Room air)            Physical Exam    Vital signs reviewed. Nursing note reviewed.  Constitutional: Well-developed. Well-nourished. In no acute distress  HENT: Mucous membranes moist. TMs intact  bilaterally. No trismus. Uvula midline. Mild posterior pharynx erythema.  No petechiae, exudates, or posterior pharynx edema.  EYES: No scleral icterus or conjunctival injection.  NECK: Full ROM. Supple.   CARDIAC: Normal rate. Normal S1/ S2. 2+ distal pulses. No edema  PULM/CHEST: Clear to auscultation bilaterally. No wheezes  Extremities: Full ROM  NEURO: Awake, alert, following commands, moving extremities, answering questions.   SKIN: Warm and dry. No rash or lesions.  PSYCH: Normal judgment. Normal affect.               MDM      Patient is a healthy 49-year-old female who presents to immediate care due to cough x 4 days.  Patient arrives with stable vitals speaking complete sentences in no respiratory distress.  Physical exam unremarkable with lungs clear to auscultation.  Most likely viral URI, acute cough, acute sinusitis.   Less likely pneumonia as patient had chest x-ray performed today in immediate care showing no consolidation, x-ray personally reviewed by me.  Will prescribe albuterol inhaler as needed for supportive treatment.  Work note given.  Discussed supportive treatment including Tylenol and ibuprofen as needed.  Antihistamine such as Claritin or Zyrtec and decongestant such as Sudafed.  Return precautions including worsening cough, fever chest pain shortness of breath.  History given by patient.  Patient agreeable to plan all questions answered.                                     Medical Decision Making      Disposition and Plan     Clinical Impression:  1. URI with cough and congestion    2. Cough         Disposition:  Discharge  9/29/2024 12:58 pm    Follow-up:  Gricel Lima  [address] [place] [id number] [phone]    Call             Medications Prescribed:  Current Discharge Medication List        START taking these medications    Details   albuterol 108 (90 Base) MCG/ACT Inhalation Aero Soln Inhale 2 puffs into the lungs every 4 (four) hours as needed for Wheezing.  Qty: 1  each, Refills: 0

## (undated) NOTE — LETTER
[facility]  [address] [address]  [phone]     Patient: Brett Rodriguez   YOB: 1975   Date of Visit: 10/28/2020     Dear Employer,        October 28, 2020    At [facility], we are taking specia Persons infected with SARS-CoV-2 who never develop COVID-19 symptoms may discontinue isolation and other precautions 10 days after the date of their first positive RT-PCR test for SARS-CoV-2 RNA.     Persons who are asymptomatic but have been exposed, CDC r

## (undated) NOTE — LETTER
[facility]  [address] [facility] [address]  [phone]     Patient: Palmer Charles   YOB: 1975   Date of Visit: 11/30/2020     Dear Employer,        November 30, 2020    At [facility], we are taking speci Persons infected with SARS-CoV-2 who never develop COVID-19 symptoms may discontinue isolation and other precautions 10 days after the date of their first positive RT-PCR test for SARS-CoV-2 RNA.     Persons who are asymptomatic but have been exposed, CDC r

## (undated) NOTE — LETTER
[address] [place] [address]  Dept: [phone]  Dept Fax: [phone]      January 18, 2018    Patient: Moon Ness   Date of Visit: 1/18/2018       To Whom It May Concern:    Noel Manzano was seen and

## (undated) NOTE — LETTER
Date & Time: 2/3/2023, 3:50 PM  Patient: Newton Evans  Encounter Provider(s):    REKHA Jacobo       To Whom It May Concern:    Newton Evans was seen and treated in our department on 2/3/2023. Please excuse from work today and tomorrow.   If you have any questions or concerns, please do not hesitate to call.        _____________________________  Physician/APC Signature

## (undated) NOTE — LETTER
Date & Time: 11/3/2023, 11:19 AM  Patient: Connor Bradford  Encounter Provider(s):    REKHA Barnett       To Whom It May Concern:    Connor Bradford was seen and treated in our department on 11/3/2023. She should not return to work until Wednesday, November 8th 2023 .     If you have any questions or concerns, please do not hesitate to call.        _____________________________  Physician/APC Signature

## (undated) NOTE — LETTER
Date & Time: 9/29/2024, 12:58 PM  Patient: Pushpa Ruiz  Encounter Provider(s):    Luisa Kenyon PA-C       To Whom It May Concern:    Pushpa Ruiz was seen and treated in our department on 9/29/2024. She should not return to work until 10/2/2024 .    If you have any questions or concerns, please do not hesitate to call.        _____________________________  Physician/APC Signature

## (undated) NOTE — LETTER
Date & Time: 11/7/2023, 3:45 PM  Patient: aVnessa Collins  Encounter Provider(s):    REKHA Dyson       To Whom It May Concern:    Vanessa Collins was seen and treated in our department on 11/7/2023. She can return to work 11/11/23 if feeling improved.     If you have any questions or concerns, please do not hesitate to call.        _____________________________  Physician/APC Signature

## (undated) NOTE — LETTER
Date & Time: 1/1/2020, 8:27 AM  Patient: Greg Ford  Encounter Provider(s):    Lien Awad MD       To Whom It May Concern:    Amy Levine was seen and treated in our department on 1/1/2020. She should not return to work until 1/4/2020.     If